# Patient Record
Sex: MALE | Race: OTHER | NOT HISPANIC OR LATINO | URBAN - METROPOLITAN AREA
[De-identification: names, ages, dates, MRNs, and addresses within clinical notes are randomized per-mention and may not be internally consistent; named-entity substitution may affect disease eponyms.]

---

## 2023-03-20 ENCOUNTER — OFFICE VISIT (OUTPATIENT)
Dept: MEDICAL GROUP | Facility: MEDICAL CENTER | Age: 73
End: 2023-03-20

## 2023-03-20 VITALS
SYSTOLIC BLOOD PRESSURE: 98 MMHG | RESPIRATION RATE: 16 BRPM | HEIGHT: 67 IN | HEART RATE: 98 BPM | BODY MASS INDEX: 28.24 KG/M2 | OXYGEN SATURATION: 97 % | DIASTOLIC BLOOD PRESSURE: 60 MMHG | TEMPERATURE: 97.8 F | WEIGHT: 179.9 LBS

## 2023-03-20 DIAGNOSIS — I10 ESSENTIAL HYPERTENSION: ICD-10-CM

## 2023-03-20 DIAGNOSIS — E78.2 MIXED HYPERLIPIDEMIA: ICD-10-CM

## 2023-03-20 DIAGNOSIS — F51.01 PRIMARY INSOMNIA: ICD-10-CM

## 2023-03-20 DIAGNOSIS — Z86.73 H/O TIA (TRANSIENT ISCHEMIC ATTACK) AND STROKE: ICD-10-CM

## 2023-03-20 PROCEDURE — 99204 OFFICE O/P NEW MOD 45 MIN: CPT | Performed by: FAMILY MEDICINE

## 2023-03-20 RX ORDER — TRAZODONE HYDROCHLORIDE 50 MG/1
50 TABLET ORAL NIGHTLY
Qty: 30 TABLET | Refills: 3 | Status: SHIPPED | OUTPATIENT
Start: 2023-03-20

## 2023-03-20 RX ORDER — BETAHISTINE HCL 100 %
POWDER (GRAM) MISCELLANEOUS
COMMUNITY

## 2023-03-20 RX ORDER — ZOLPIDEM TARTRATE 5 MG/1
5 TABLET ORAL NIGHTLY PRN
Qty: 20 TABLET | Refills: 0 | Status: SHIPPED | OUTPATIENT
Start: 2023-03-20 | End: 2023-04-09

## 2023-03-20 RX ORDER — AMLODIPINE AND VALSARTAN 5; 160 MG/1; MG/1
1 TABLET ORAL DAILY
Qty: 90 TABLET | Refills: 2 | Status: SHIPPED | OUTPATIENT
Start: 2023-03-20

## 2023-03-20 RX ORDER — AMLODIPINE AND VALSARTAN 10; 160 MG/1; MG/1
1 TABLET ORAL DAILY
COMMUNITY

## 2023-03-20 RX ORDER — ATORVASTATIN CALCIUM 20 MG/1
20 TABLET, FILM COATED ORAL NIGHTLY
COMMUNITY

## 2023-03-20 ASSESSMENT — PATIENT HEALTH QUESTIONNAIRE - PHQ9: CLINICAL INTERPRETATION OF PHQ2 SCORE: 0

## 2023-03-20 NOTE — PROGRESS NOTES
CC: New patient: Hypertension, hyperlipidemia, insomnia, history of TIA    HPI:  Christine presents today to establish a new PCP.    Patient has been active and independent with all ADLs.  Has the following medical issues:    Essential hypertension  Blood pressure is slightly low.  Patient has been amlodipine/valsartan  mg, currently denies any headache or dizziness.    Mixed hyperlipidemia  He has been tolerating the statin. Denies muscle pain LFTs has been on atorvastatin 20 mg daily    Primary insomnia  Patient has been having serious problems sleeping, could not sleep at night, however he sleeps very few hours during the day 2 to 3 hours.  Has been feeling tired.Patient just came from [place] which is about 9 hours head.    H/O TIA (transient ischemic attack) and stroke  Patient with history of TIA, currently no residual effect.  Has been on Plavix 75 mg daily, and atorvastatin 20 mg daily.    Patient Active Problem List    Diagnosis Date Noted    Essential hypertension 03/20/2023    Mixed hyperlipidemia 03/20/2023    Primary insomnia 03/20/2023    H/O TIA (transient ischemic attack) and stroke 03/20/2023       Current Outpatient Medications   Medication Sig Dispense Refill    Betahistine HCl (BETAHISTINE DIHYDROCHLORIDE) Powder       atorvastatin (LIPITOR) 20 MG Tab Take 20 mg by mouth every evening.      amlodipine-valsartan (EXFORGE)  MG per tablet Take 1 Tablet by mouth every day.      zolpidem (AMBIEN) 5 MG Tab Take 1 Tablet by mouth at bedtime as needed for Sleep for up to 20 days. 20 Tablet 0    amlodipine-valsartan (EXFORGE) 5-160 MG per tablet Take 1 Tablet by mouth every day. 90 Tablet 2    traZODone (DESYREL) 50 MG Tab Take 1 Tablet by mouth every evening. 30 Tablet 3     No current facility-administered medications for this visit.         Allergies as of 03/20/2023    (Not on File)        Social History     Socioeconomic History    Marital status:      Spouse name: Not on file     "Number of children: Not on file    Years of education: Not on file    Highest education level: Not on file   Occupational History    Not on file   Tobacco Use    Smoking status: Never    Smokeless tobacco: Never   Vaping Use    Vaping Use: Never used   Substance and Sexual Activity    Alcohol use: Never    Drug use: Never    Sexual activity: Not on file   Other Topics Concern    Not on file   Social History Narrative    Not on file     Social Determinants of Health     Financial Resource Strain: Not on file   Food Insecurity: Not on file   Transportation Needs: Not on file   Physical Activity: Not on file   Stress: Not on file   Social Connections: Not on file   Intimate Partner Violence: Not on file   Housing Stability: Not on file       History reviewed. No pertinent family history.    History reviewed. No pertinent surgical history.    ROS:  Denies any Headache, Blurred Vision, Confusion Chest pain,  Shortness of breath,  Abdominal pain, Changes of bowel or bladder, Lower ext edema, Fevers, Nights sweats, Weight Changes, Focal weakness or numbness.  All other systems are negative.    BP 98/60 (BP Location: Right arm, Patient Position: Sitting, BP Cuff Size: Adult)   Pulse 98   Temp 36.6 °C (97.8 °F) (Temporal)   Resp 16   Ht 1.702 m (5' 7\") Comment: tamara stated  Wt 81.6 kg (179 lb 14.3 oz)   SpO2 97%   BMI 28.18 kg/m²     Physical Exam:  Gen:         Alert and oriented, No apparent distress.  HEENT:   [place], TM clear,  Oralpharynx no erythema or exudates.  Neck:       No Jugular venous distension, Lymphadenopathy, Thyromegaly, Bruits.  Lungs:     Clear to auscultation bilaterally  CV:          Regular rate and rhythm. No murmurs, rubs or gallops.  Abd:         Soft non tender, non distended. Normal active bowel sounds. No                                        Hepatosplenomegaly, No pulsatile masses.  Ext:          No clubbing, cyanosis, edema.      Assessment and Plan.   73 y.o. male     1. Essential " hypertension  Blood pressure is slightly.  We will decrease the dose of amlodipine/valsartan  mg to a lower dose(5-160 mg).  Continue to check the blood pressure periodically, call me for any concerns.    - amlodipine-valsartan (EXFORGE) 5-160 MG per tablet; Take 1 Tablet by mouth every day.  Dispense: 90 Tablet; Refill: 2    2. Mixed hyperlipidemia  He has been tolerating the statin. Denies muscle pain LFTs has been normal  Continue atorvastatin 20 mg daily    3. Primary insomnia  Patient has been having serious problems sleeping, could not sleep at night, however he sleeps very few hours during the day 2 to 3 hours.  Has been feeling tired.  Probably some problem of a jet lack, patient just came from [place] which is about 9 hours head.  However patient patient advised to try trazodone for sleep, use Ambien only if he really needs it.    - zolpidem (AMBIEN) 5 MG Tab; Take 1 Tablet by mouth at bedtime as needed for Sleep for up to 20 days.  Dispense: 20 Tablet; Refill: 0  - traZODone (DESYREL) 50 MG Tab; Take 1 Tablet by mouth every evening.  Dispense: 30 Tablet; Refill: 3    4. H/O TIA (transient ischemic attack) and stroke  No residual effect.  Continue on Plavix, and atorvastatin